# Patient Record
Sex: FEMALE | Race: BLACK OR AFRICAN AMERICAN | NOT HISPANIC OR LATINO | Employment: FULL TIME | URBAN - METROPOLITAN AREA
[De-identification: names, ages, dates, MRNs, and addresses within clinical notes are randomized per-mention and may not be internally consistent; named-entity substitution may affect disease eponyms.]

---

## 2024-02-23 ENCOUNTER — APPOINTMENT (OUTPATIENT)
Dept: PRIMARY CARE | Facility: CLINIC | Age: 49
End: 2024-02-23
Payer: COMMERCIAL

## 2024-02-26 ENCOUNTER — TELEPHONE (OUTPATIENT)
Dept: PRIMARY CARE | Facility: CLINIC | Age: 49
End: 2024-02-26
Payer: COMMERCIAL

## 2024-02-26 NOTE — TELEPHONE ENCOUNTER
Copied from CRM #[id number]. Topic: Information Request - Prescription Refill FAQ  >> Feb 23, 2024 12:03 PM Natasha SINCLAIR wrote:  Amlodpine 10mg, Patient will be out of medication before her appointment in April

## 2024-02-29 DIAGNOSIS — I10 BENIGN ESSENTIAL HTN: Primary | ICD-10-CM

## 2024-03-04 PROBLEM — F17.200 NICOTINE DEPENDENCE: Status: ACTIVE | Noted: 2024-03-04

## 2024-03-04 PROBLEM — I10 BENIGN ESSENTIAL HTN: Status: ACTIVE | Noted: 2024-03-04

## 2024-03-04 PROBLEM — E78.5 DYSLIPIDEMIA: Status: ACTIVE | Noted: 2024-03-04

## 2024-03-04 RX ORDER — AMLODIPINE BESYLATE 10 MG/1
10 TABLET ORAL DAILY
Qty: 30 TABLET | Refills: 0 | Status: SHIPPED | OUTPATIENT
Start: 2024-03-04 | End: 2024-04-09 | Stop reason: SDUPTHER

## 2024-03-04 RX ORDER — ATORVASTATIN CALCIUM 20 MG/1
20 TABLET, FILM COATED ORAL NIGHTLY
COMMUNITY
Start: 2020-10-23 | End: 2024-04-25 | Stop reason: SDUPTHER

## 2024-03-04 RX ORDER — AMLODIPINE BESYLATE 10 MG/1
10 TABLET ORAL DAILY
COMMUNITY
End: 2024-03-04 | Stop reason: SDUPTHER

## 2024-04-09 ENCOUNTER — LAB (OUTPATIENT)
Dept: LAB | Facility: LAB | Age: 49
End: 2024-04-09
Payer: COMMERCIAL

## 2024-04-09 ENCOUNTER — OFFICE VISIT (OUTPATIENT)
Dept: PRIMARY CARE | Facility: CLINIC | Age: 49
End: 2024-04-09
Payer: COMMERCIAL

## 2024-04-09 VITALS
TEMPERATURE: 96.5 F | HEIGHT: 71 IN | OXYGEN SATURATION: 100 % | BODY MASS INDEX: 21.56 KG/M2 | SYSTOLIC BLOOD PRESSURE: 132 MMHG | DIASTOLIC BLOOD PRESSURE: 84 MMHG | WEIGHT: 154 LBS | HEART RATE: 102 BPM

## 2024-04-09 DIAGNOSIS — I10 BENIGN ESSENTIAL HTN: ICD-10-CM

## 2024-04-09 DIAGNOSIS — F17.210 CIGARETTE NICOTINE DEPENDENCE WITHOUT COMPLICATION: ICD-10-CM

## 2024-04-09 DIAGNOSIS — I10 BENIGN ESSENTIAL HTN: Primary | ICD-10-CM

## 2024-04-09 LAB
ALBUMIN SERPL BCP-MCNC: 4.5 G/DL (ref 3.4–5)
ALP SERPL-CCNC: 58 U/L (ref 33–110)
ALT SERPL W P-5'-P-CCNC: 20 U/L (ref 7–45)
ANION GAP SERPL CALC-SCNC: 15 MMOL/L (ref 10–20)
AST SERPL W P-5'-P-CCNC: 20 U/L (ref 9–39)
BILIRUB SERPL-MCNC: 0.4 MG/DL (ref 0–1.2)
BUN SERPL-MCNC: 11 MG/DL (ref 6–23)
CALCIUM SERPL-MCNC: 9.7 MG/DL (ref 8.6–10.6)
CHLORIDE SERPL-SCNC: 102 MMOL/L (ref 98–107)
CHOLEST SERPL-MCNC: 230 MG/DL (ref 0–199)
CHOLESTEROL/HDL RATIO: 3.1
CO2 SERPL-SCNC: 27 MMOL/L (ref 21–32)
CREAT SERPL-MCNC: 0.7 MG/DL (ref 0.5–1.05)
EGFRCR SERPLBLD CKD-EPI 2021: >90 ML/MIN/1.73M*2
ERYTHROCYTE [DISTWIDTH] IN BLOOD BY AUTOMATED COUNT: 13.3 % (ref 11.5–14.5)
GLUCOSE SERPL-MCNC: 88 MG/DL (ref 74–99)
HCT VFR BLD AUTO: 42.2 % (ref 36–46)
HDLC SERPL-MCNC: 74 MG/DL
HGB BLD-MCNC: 13.8 G/DL (ref 12–16)
LDLC SERPL CALC-MCNC: 142 MG/DL
MCH RBC QN AUTO: 34 PG (ref 26–34)
MCHC RBC AUTO-ENTMCNC: 32.7 G/DL (ref 32–36)
MCV RBC AUTO: 104 FL (ref 80–100)
NON HDL CHOLESTEROL: 156 MG/DL (ref 0–149)
NRBC BLD-RTO: 0 /100 WBCS (ref 0–0)
PLATELET # BLD AUTO: 279 X10*3/UL (ref 150–450)
POTASSIUM SERPL-SCNC: 4.7 MMOL/L (ref 3.5–5.3)
PROT SERPL-MCNC: 7.4 G/DL (ref 6.4–8.2)
RBC # BLD AUTO: 4.06 X10*6/UL (ref 4–5.2)
SODIUM SERPL-SCNC: 139 MMOL/L (ref 136–145)
TRIGL SERPL-MCNC: 70 MG/DL (ref 0–149)
TSH SERPL-ACNC: 0.76 MIU/L (ref 0.44–3.98)
VLDL: 14 MG/DL (ref 0–40)
WBC # BLD AUTO: 5.4 X10*3/UL (ref 4.4–11.3)

## 2024-04-09 PROCEDURE — 3075F SYST BP GE 130 - 139MM HG: CPT | Performed by: STUDENT IN AN ORGANIZED HEALTH CARE EDUCATION/TRAINING PROGRAM

## 2024-04-09 PROCEDURE — 84443 ASSAY THYROID STIM HORMONE: CPT

## 2024-04-09 PROCEDURE — 80061 LIPID PANEL: CPT

## 2024-04-09 PROCEDURE — 99213 OFFICE O/P EST LOW 20 MIN: CPT | Performed by: STUDENT IN AN ORGANIZED HEALTH CARE EDUCATION/TRAINING PROGRAM

## 2024-04-09 PROCEDURE — 3079F DIAST BP 80-89 MM HG: CPT | Performed by: STUDENT IN AN ORGANIZED HEALTH CARE EDUCATION/TRAINING PROGRAM

## 2024-04-09 PROCEDURE — 85027 COMPLETE CBC AUTOMATED: CPT

## 2024-04-09 PROCEDURE — 80053 COMPREHEN METABOLIC PANEL: CPT

## 2024-04-09 PROCEDURE — 36415 COLL VENOUS BLD VENIPUNCTURE: CPT

## 2024-04-09 RX ORDER — HYDROXYZINE HYDROCHLORIDE 25 MG/1
25 TABLET, FILM COATED ORAL EVERY 6 HOURS PRN
COMMUNITY
Start: 2018-05-28 | End: 2024-04-09 | Stop reason: ALTCHOICE

## 2024-04-09 RX ORDER — DIPHENHYDRAMINE HCL 25 MG
4 CAPSULE ORAL AS NEEDED
Qty: 100 EACH | Refills: 3 | Status: SHIPPED | OUTPATIENT
Start: 2024-04-09 | End: 2024-05-09

## 2024-04-09 RX ORDER — TIZANIDINE 2 MG/1
TABLET ORAL EVERY 8 HOURS
COMMUNITY
Start: 2020-08-24 | End: 2024-04-09 | Stop reason: ALTCHOICE

## 2024-04-09 RX ORDER — AMLODIPINE BESYLATE 10 MG/1
10 TABLET ORAL DAILY
Qty: 90 TABLET | Refills: 3 | Status: SHIPPED | OUTPATIENT
Start: 2024-04-09 | End: 2025-04-09

## 2024-04-09 ASSESSMENT — PAIN SCALES - GENERAL: PAINLEVEL: 0-NO PAIN

## 2024-04-09 NOTE — PROGRESS NOTES
"Subjective   Patient ID: Savi Hubbard is a 48 y.o. female who presents for Med Refill.    #HTN  Blood pressure well-controlled at home; typically sits in 130s systolic & never jumps above 145. Does not recall diastolic readings. Takes medication every morning & takes home BP before work everyday. Has not missed any doses. No HA, dizziness, vision changes, leg swelling.    #Cholesterol  Diet is okay, stays away from fried foods. Eats eggs, chicken, yogurt, broccoli, string beans. 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    #Smoking  Has cut back to 3 cigarettes per day. Interested in trying nicotine gum for further smoking cessation.   Has smoked approximately half a pack per day since age 21 (13.5 pack years).    #Health maintenance  Pt would like all health maintenance done at her next primary care visit at 50 years of age, which includes:  -Cervical cancer screening  -Colon cancer screening  -Breast cancer screening  Does not desire STD testing at this time.          Review of Systems   All other systems reviewed and are negative.      Objective   /84 (BP Location: Left arm, Patient Position: Sitting)   Pulse 102   Temp 35.8 °C (96.5 °F) (Temporal)   Ht 1.803 m (5' 11\")   Wt 69.9 kg (154 lb)   SpO2 100%   BMI 21.48 kg/m²     Physical Exam  Vitals reviewed.   Constitutional:       Appearance: Normal appearance. She is normal weight.   HENT:      Head: Normocephalic and atraumatic.   Eyes:      Extraocular Movements: Extraocular movements intact.      Pupils: Pupils are equal, round, and reactive to light.   Cardiovascular:      Rate and Rhythm: Normal rate and regular rhythm.      Heart sounds: Normal heart sounds.   Pulmonary:      Effort: Pulmonary effort is normal.      Breath sounds: Normal breath sounds.   Abdominal:      General: There is no distension.      Palpations: Abdomen is soft.      Tenderness: There is no abdominal tenderness.   Musculoskeletal:         General: Normal range of motion.     "  Cervical back: Normal range of motion and neck supple.   Skin:     General: Skin is warm and dry.   Neurological:      General: No focal deficit present.      Mental Status: She is alert and oriented to person, place, and time.   Psychiatric:         Mood and Affect: Mood normal.         Behavior: Behavior normal.         Assessment/Plan   48F presenting for followup for the following problems addressed today:    #HTN - stable, controlled  - Amlodipine reordered as patient is doing well on current regimen  - Counseled about smoking effects on blood pressure & that quitting smoking will likely lead to improvement in blood pressure  - Ordered lipid panel, TSH,     #DLD - stable  ::Cholesterol elevated on last lipid panel in 2020  - Lipid panel ordered  - Encouraged more vegetables in diet  - may consider restarting home atorvastatin if lipid panel elevated    #Tobacco Use Disorder - improving as pt is actively working on quitting  ::13.5 pack years  - Nicotine gum ordered  - Encouraged smoking cessation     #Thyromegaly  - chronic, stable w/ some cold intolerance  - noted on CT Chest in 2020  - prior TSH normal, will repeat today w/ CBC    Sbarina Gandara  M3    I saw and evaluated the patient. I personally obtained the key and critical portions of the history and physical exam. I reviewed and modified the student's documentation and discussed the patient with the medical student. I agree with the above documentation and medical decision making.    Follow up/Return to the clinic in 3 months    Attending Supervision: discussed with attending physician, Dr. Fredis Starr MD  Family Medicine, PGY-3

## 2024-04-09 NOTE — PROGRESS NOTES
"Subjective   Patient ID: Savi Hubbard is a 48 y.o. female who presents for Med Refill.    HPI      Started at age [age]; 1/2 pack a day  Review of Systems    Objective   /84 (BP Location: Left arm, Patient Position: Sitting)   Pulse 102   Temp 35.8 °C (96.5 °F) (Temporal)   Ht 1.803 m (5' 11\")   Wt 69.9 kg (154 lb)   SpO2 100%   BMI 21.48 kg/m²      Physical Exam     Assessment/Plan   Diagnoses and all orders for this visit:  Cigarette nicotine dependence without complication  -     nicotine polacrilex (Nicorette) 4 mg gum; Chew 1 each (4 mg) if needed for smoking cessation.  Benign essential HTN  -     amLODIPine (Norvasc) 10 mg tablet; Take 1 tablet (10 mg) by mouth once daily.      Follow up/Return to the clinic in {RTC:96153}    {Attending Supervision:20435::\"discussed\":1} with attending physician,  ***    Tatyana Starr MD  Family Medicine, PGY-3    "

## 2024-04-11 NOTE — PROGRESS NOTES
I reviewed the resident's documentation and discussed the patient with the resident. I agree with the resident's medical decision making as documented in the note.    Star Mcneal MD

## 2024-04-25 DIAGNOSIS — E78.5 DYSLIPIDEMIA: Primary | ICD-10-CM

## 2024-04-25 RX ORDER — ATORVASTATIN CALCIUM 20 MG/1
20 TABLET, FILM COATED ORAL NIGHTLY
Qty: 90 TABLET | Refills: 3 | Status: SHIPPED | OUTPATIENT
Start: 2024-04-25 | End: 2025-04-25

## 2024-04-25 NOTE — RESULT ENCOUNTER NOTE
Spoke w/ pt over the phone. Used to be on atorvastatin 20mg. Current ASCVD low at 4.7%. However given upward trend of lipid panel, will restart home atorvastatin 20mg. Patient agreeable with plan. Script sent to her pharmacy. Rest of labwork is normal.

## 2025-06-11 DIAGNOSIS — I10 BENIGN ESSENTIAL HTN: ICD-10-CM

## 2025-06-11 DIAGNOSIS — E78.5 DYSLIPIDEMIA: ICD-10-CM

## 2025-06-18 RX ORDER — AMLODIPINE BESYLATE 10 MG/1
10 TABLET ORAL DAILY
Qty: 30 TABLET | Refills: 0 | Status: SHIPPED | OUTPATIENT
Start: 2025-06-18 | End: 2025-09-16

## 2025-06-18 RX ORDER — ATORVASTATIN CALCIUM 20 MG/1
20 TABLET, FILM COATED ORAL NIGHTLY
Qty: 30 TABLET | Refills: 0 | Status: SHIPPED | OUTPATIENT
Start: 2025-06-18 | End: 2025-09-16

## 2025-06-18 NOTE — TELEPHONE ENCOUNTER
Pt called requesting refill of amlodipine and atorvastatin. Noted pt in need of establishing. Call placed to encourage scheduling. Pt scheduled for Jun 27th 2025. Orders pended and routed to provider. Pt made aware of 72-hr policy. This nurse inquired if pt monitoring BP ast home d/t out of med. Pt confirms steady values, at this time. Pt inquired if children were able to come back to . Noted pts as LV over 3 years. Informed pt to inform sins to call clinic and schedule new pt visits, and if unable to get to reach staff d/t phone tree issues, then call clinic and leave message on refill line for nurse to callback to assist to schedule. Pt confirms understanding.

## 2025-06-27 ENCOUNTER — OFFICE VISIT (OUTPATIENT)
Facility: HOSPITAL | Age: 50
End: 2025-06-27
Payer: COMMERCIAL

## 2025-06-27 VITALS
OXYGEN SATURATION: 98 % | TEMPERATURE: 97.5 F | SYSTOLIC BLOOD PRESSURE: 144 MMHG | DIASTOLIC BLOOD PRESSURE: 94 MMHG | HEIGHT: 71 IN | HEART RATE: 125 BPM | BODY MASS INDEX: 20.66 KG/M2 | WEIGHT: 147.6 LBS

## 2025-06-27 DIAGNOSIS — Z12.11 ENCOUNTER FOR SCREENING FOR MALIGNANT NEOPLASM OF COLON: ICD-10-CM

## 2025-06-27 DIAGNOSIS — Z59.41 FOOD INSECURITY: ICD-10-CM

## 2025-06-27 DIAGNOSIS — I45.10 RBBB: ICD-10-CM

## 2025-06-27 DIAGNOSIS — F10.90 ALCOHOL USE: ICD-10-CM

## 2025-06-27 DIAGNOSIS — R71.8 ELEVATED MCV: ICD-10-CM

## 2025-06-27 DIAGNOSIS — R63.4 WEIGHT LOSS: ICD-10-CM

## 2025-06-27 DIAGNOSIS — Z12.31 ENCOUNTER FOR SCREENING MAMMOGRAM FOR BREAST CANCER: ICD-10-CM

## 2025-06-27 DIAGNOSIS — R00.0 TACHYCARDIA: ICD-10-CM

## 2025-06-27 DIAGNOSIS — E78.5 HYPERLIPIDEMIA, UNSPECIFIED HYPERLIPIDEMIA TYPE: ICD-10-CM

## 2025-06-27 DIAGNOSIS — E55.9 VITAMIN D DEFICIENCY: ICD-10-CM

## 2025-06-27 DIAGNOSIS — I10 BENIGN ESSENTIAL HTN: Primary | ICD-10-CM

## 2025-06-27 PROBLEM — B96.89 BACTERIAL VAGINOSIS: Status: ACTIVE | Noted: 2025-06-27

## 2025-06-27 PROBLEM — L25.9 CONTACT DERMATITIS: Status: RESOLVED | Noted: 2025-06-27 | Resolved: 2025-06-27

## 2025-06-27 PROBLEM — N76.0 BACTERIAL VAGINOSIS: Status: RESOLVED | Noted: 2025-06-27 | Resolved: 2025-06-27

## 2025-06-27 PROBLEM — B96.89 BACTERIAL VAGINOSIS: Status: RESOLVED | Noted: 2025-06-27 | Resolved: 2025-06-27

## 2025-06-27 PROBLEM — N76.0 BACTERIAL VAGINOSIS: Status: ACTIVE | Noted: 2025-06-27

## 2025-06-27 PROBLEM — L25.9 CONTACT DERMATITIS: Status: ACTIVE | Noted: 2025-06-27

## 2025-06-27 RX ORDER — AMLODIPINE BESYLATE 10 MG/1
10 TABLET ORAL DAILY
Qty: 90 TABLET | Refills: 0 | Status: SHIPPED | OUTPATIENT
Start: 2025-06-27 | End: 2025-09-25

## 2025-06-27 SDOH — ECONOMIC STABILITY: FOOD INSECURITY: WITHIN THE PAST 12 MONTHS, YOU WORRIED THAT YOUR FOOD WOULD RUN OUT BEFORE YOU GOT MONEY TO BUY MORE.: NEVER TRUE

## 2025-06-27 SDOH — ECONOMIC STABILITY: FOOD INSECURITY: WITHIN THE PAST 12 MONTHS, THE FOOD YOU BOUGHT JUST DIDN'T LAST AND YOU DIDN'T HAVE MONEY TO GET MORE.: SOMETIMES TRUE

## 2025-06-27 SDOH — ECONOMIC STABILITY - FOOD INSECURITY: FOOD INSECURITY: Z59.41

## 2025-06-27 ASSESSMENT — PAIN SCALES - GENERAL: PAINLEVEL_OUTOF10: 0-NO PAIN

## 2025-06-27 ASSESSMENT — PATIENT HEALTH QUESTIONNAIRE - PHQ9
SUM OF ALL RESPONSES TO PHQ9 QUESTIONS 1 AND 2: 0
1. LITTLE INTEREST OR PLEASURE IN DOING THINGS: NOT AT ALL
2. FEELING DOWN, DEPRESSED OR HOPELESS: NOT AT ALL

## 2025-06-27 NOTE — PROGRESS NOTES
Subjective   Patient ID: Savi Hubbard is a 49 y.o. female who presents for Follow-up.    HPI    Establish care  - Medical History: HTN, HLD, Thyromegaly, alcohol use, nicotine dependence,   - Meds: amlodipine 10 mg daily, Lipitor 20 mg daily  - OTC: just tylenol as needed  - Allergies: Penicillins  - FHx:  --- Mom HTN  --- Dad Healthy  --- Siblings healthy 2 sisters  --- Malignancy/Genetic disease/Early Cardiac death denies  - Surgical hx: , tubal ligation  - Social hx:   --- Diet (24h recall):   BF: nibbled on eggs and sausage, coffee with milk 2% no sugar  Lunch: grilled chicken salad with eggs, tomatoes, shredded lettuce, cucumbers with ranch dressing, water all day  Snacks: bag of chip doritos  Dinner: same salad with ranch dressing  --- Exercise: walking   --- Weight: decreasing, unintentional weight loss of 3-4 kg  --- Smoking: current smoker 1/3 pack daily, ~12.5 py  --- Sleep: 6-7h of sleep nightly, 8 pm IN BED, SLEEP BY 9, WAKES UP AT 4:15 aM CZ HAS TO BE AT WORK AT 5:30   --- EtOH: Alcohol Use: Yes, patient drinks alcohol. 2 beers a day, oil can 24 oz each  --- Illicit substances: denied.  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 Thinks she is going through menopause, has hot flashes occasionally    Phq-2 negative  Food insecurity positive    Concerns:     Hypertension  BP Readings from Last 3 Encounters:   25 (!) 144/94   24 132/84   10/19/22 114/78     - meds: amlodipine 10 mg   - missing doses?: no  - taking BP at home?: 110/95, 125/75   - smoking: yes see above  - diet: see above  - Consistent NSAIDs?: no  - denies HA, chest pains, SOB, LE edema, vision changes     HLD  - working on     Smoking cessation  Alcohol cessation    Weight loss   ~past 6 month  Doesn't  "have a lot of appetite  Tends to eat small meals   No heat or cold intolerance  Has palpitations drinks water  No dizziness, no sob, no cp, no HA, no FND, no pain, no rashes, no masses, no hematochezia, no hematuria, no ap, n/v, vaginal bleeding  Due for Mammo, pap smear,   Has cologuard 6/28/22 due now    Review of Systems  All other systems reviewed were negative    Medications Ordered Prior to Encounter[1]     Objective   Vitals: BP (!) 144/94 (BP Location: Right arm, Patient Position: Sitting, BP Cuff Size: Adult)   Pulse (!) 125   Temp 36.4 °C (97.5 °F) (Temporal)   Ht 1.803 m (5' 11\")   Wt 67 kg (147 lb 9.6 oz)   SpO2 98%   BMI 20.59 kg/m²      Physical Exam  General: well-appearing, NAD HEENT: NC/AT Cardiac: rrr, no m/r/g Lungs: normal work of breathing, CTAB Abdomen: soft, non-tender, non-distended, BS present Neuro: grossly intact MSK: No peripheral edema, cyanosis, or pallor Psych: no depression, anxiety      Assessment/Plan     Savi Hubbard is a 49 y.o. female with PMH of HTN, HLD, Thyromegaly, alcohol use, nicotine dependence who presents for Follow-up.  Patient with mildly elevated BP in office and tachycardia.    #Hypertension  :: Chronic, Stable  :: per pt ambulatory BP reads at goal <130/80  -current medication regimen unchanged. Amlodipine 10 mg daily  -Repeat labs & uacr  -will review ambulatory recordings at follow-up and titrate meds as needed    #Tachycardia  :: Chronic, Stable  :: Likely in the setting of Differential: Thyroid dysfunction versus anemia versus arrhythmiaversus related to smoking or alcohol  -EKG in office with sinus tachycardia although there is evidence of RBBB and LAFB without any prior EKG to compare to  -Patient asymptomatic denying any shortness of breath, chest pain, lightheadedness, dizziness, nausea, vomiting and does not seem acutely ill therefore would be safe to continue outpatient workup and management  - Workup ordered including CBC, iron studies, TSH, vitamin " B12 and folate  - Referred for echocardiogram to rule out cardiomyopathy and heart failure    #Hyperlipidemia  :: Chronic, Poorly controlled  :: Likely in the setting of Diet nonadherence  -current medication regimen unchanged.  Lipitor 20 mg nightly  - Will repeat lipid panel and uptitrate as needed    #Wt loss  #History of thyromegaly  :: Chronic, Poorly controlled  :: Likely in the setting of Differential: Food insecurity versus versus thyroid dysfunction versus diabetes versus malnutrition in the setting of decreased liver function secondary to alcohol use versus low appetite from gastritis underlying malignancy, less likely depression or anxiety as PHQ 2 is negative today  -Referred for [facility]  -Patient does have a history of thyromegaly on CT 8/24/2020 with normal thyroid function panel, will repeat thyroid function and after review will consider referral for ultrasound to follow-up on thyroid  - Workup sent including TSH, A1c, hepatic, RFP  - Referred for mammogram and colonoscopy  - Offered Pap smear however patient preferred to defer to follow-up  - If initial workup is noncontributory and patient continues to lose weight can consider H. pylori testing, versus celiac testing versus refer to GI for EGD versus imaging      #Elevated MCV  #HX of alcohol use ~ 4 standard drinks daily (2x24ox beers)  :: Chronic, Poorly controlled  :: Likely in the setting of alcohol use  - Ordered B12 and folate level with repeat CBC  - Counseled on importance of decreasing alcohol    #Nicotine dependence  :: Chronic, Poorly controlled  - Counseled on importance of smoking cessation  - Will discuss resources at follow-up, previously tried nicotine gums which did not work and gave her headache    #Vit D deficiency  :: Chronic, Stable  - Has not been taking supplement, will repeat follow-up levels and need to supplement    Problem List Items Addressed This Visit           ICD-10-CM    Benign essential HTN - Primary I10     Relevant Medications    amLODIPine (Norvasc) 10 mg tablet    Other Relevant Orders    Albumin-Creatinine Ratio, Urine Random    Hepatic Function Panel    Magnesium    Renal Function Panel    Thyroid Stimulating Hormone    Weight loss R63.4    Relevant Orders    CBC and Auto Differential    Hemoglobin A1C    Hepatic Function Panel    Magnesium    Renal Function Panel    Thyroid Stimulating Hormone    Elevated MCV R71.8    Relevant Orders    CBC and Auto Differential    Vitamin B12    Folate    Alcohol use F10.90    Relevant Orders    Vitamin B12    Folate    Vitamin D deficiency E55.9    Relevant Orders    Vitamin D 1,25 Dihydroxy (for eval of hypercalcemia)    Tachycardia R00.0    Relevant Orders    Thyroid Stimulating Hormone    ECG 12 lead (Clinic Performed) (Completed)    Iron and TIBC    Ferritin     Other Visit Diagnoses         Codes      Food insecurity     Z59.41    Relevant Orders    Referral to [facility]      Encounter for screening mammogram for breast cancer     Z12.31    Relevant Orders    BI mammo bilateral screening tomosynthesis      Hyperlipidemia, unspecified hyperlipidemia type     E78.5    Relevant Orders    Lipid Panel Non-Fasting      Encounter for screening for malignant neoplasm of colon     Z12.11    Relevant Orders    Cologuard® colon cancer screening      RBBB     I45.10    Relevant Orders    Transthoracic Echo (TTE) Complete            Attending Supervision: seen and discussed with attending physician (cosigner listed on this note) Dr. Stern.    RTC in 2 to 3 months for 2 visits to follow-up on hypertension with ambulatory readings, hyperlipidemia, and focus on discussion of smoking cessation and alcohol use counseling and thyromegaly and another visit for healthcare maintenance with Pap smear, or earlier as needed.    Plan preliminary until cosigned by attending physician.    Sofia Sandhu M.D.  Family Medicine  PGY-2         [1]   Current Outpatient Medications on File Prior to  Visit   Medication Sig Dispense Refill    atorvastatin (Lipitor) 20 mg tablet Take 1 tablet (20 mg) by mouth once daily at bedtime. 30 tablet 0    [DISCONTINUED] amLODIPine (Norvasc) 10 mg tablet Take 1 tablet (10 mg) by mouth once daily. 30 tablet 0    [DISCONTINUED] nicotine polacrilex (Nicorette) 4 mg gum Chew 1 each (4 mg) if needed for smoking cessation. 100 each 3     No current facility-administered medications on file prior to visit.

## 2025-06-27 NOTE — PATIENT INSTRUCTIONS
Thank you for coming in today!   This is a summarized list of things we discussed today and next steps:  - Please call to schedule an appointment for echocardiogram [phone] or [phone]  - Please call to schedule an appointment for Mammogram [phone]  - Please get your cologuard done to screen for colon cancer  - Please call to schedule an appointment with dentist. [facility]: adult [phone]   - Please call [facility] for assistance with food [phone]  - Please continue taking amlodipine 10 mg daily and atorvastatin 20 mg daily   - Please write your blood pressures for 14 days before our next appointment  - Try dairy free yogurt alternatives or low fat-dairy  - Please go to the lab to get blood and/or urine tests   - I will leave a comment on MyChart if results are normal and I will call if any changes in management is required.  - Please read any attached handouts and medication list  - Please schedule a 2 follow-up apts with me, one for physical with pap smear and another for follow-up in 3 months. Please call my office at [phone] with any questions.

## 2025-07-30 ENCOUNTER — APPOINTMENT (OUTPATIENT)
Dept: RADIOLOGY | Facility: HOSPITAL | Age: 50
End: 2025-07-30
Payer: COMMERCIAL

## 2025-08-08 ENCOUNTER — APPOINTMENT (OUTPATIENT)
Dept: RADIOLOGY | Facility: HOSPITAL | Age: 50
End: 2025-08-08
Payer: COMMERCIAL

## 2025-08-12 DIAGNOSIS — E78.5 DYSLIPIDEMIA: ICD-10-CM

## 2025-08-14 LAB — NONINV COLON CA DNA+OCC BLD SCRN STL QL: NEGATIVE

## 2025-08-14 RX ORDER — ATORVASTATIN CALCIUM 20 MG/1
20 TABLET, FILM COATED ORAL NIGHTLY
Qty: 30 TABLET | Refills: 0 | Status: SHIPPED | OUTPATIENT
Start: 2025-08-14 | End: 2025-09-13